# Patient Record
Sex: FEMALE | Race: AMERICAN INDIAN OR ALASKA NATIVE | URBAN - METROPOLITAN AREA
[De-identification: names, ages, dates, MRNs, and addresses within clinical notes are randomized per-mention and may not be internally consistent; named-entity substitution may affect disease eponyms.]

---

## 2018-03-18 ENCOUNTER — HOSPITAL ENCOUNTER (EMERGENCY)
Facility: CLINIC | Age: 28
Discharge: HOME | End: 2018-03-18
Attending: EMERGENCY MEDICINE | Admitting: EMERGENCY MEDICINE

## 2018-03-18 VITALS
WEIGHT: 234.57 LBS | OXYGEN SATURATION: 93 % | DIASTOLIC BLOOD PRESSURE: 78 MMHG | TEMPERATURE: 97.7 F | SYSTOLIC BLOOD PRESSURE: 130 MMHG | HEART RATE: 84 BPM | RESPIRATION RATE: 16 BRPM

## 2018-03-18 DIAGNOSIS — K08.89 PAIN, DENTAL: ICD-10-CM

## 2018-03-18 PROCEDURE — 99284 EMERGENCY DEPT VISIT MOD MDM: CPT | Mod: Z6 | Performed by: EMERGENCY MEDICINE

## 2018-03-18 PROCEDURE — 99282 EMERGENCY DEPT VISIT SF MDM: CPT | Performed by: EMERGENCY MEDICINE

## 2018-03-18 RX ORDER — PENICILLIN V POTASSIUM 500 MG/1
500 TABLET, FILM COATED ORAL 3 TIMES DAILY
Qty: 21 TABLET | Refills: 0 | Status: SHIPPED | OUTPATIENT
Start: 2018-03-18 | End: 2018-03-25

## 2018-03-18 ASSESSMENT — ENCOUNTER SYMPTOMS
VOMITING: 0
ABDOMINAL PAIN: 0
DIARRHEA: 0
ARTHRALGIAS: 0
FEVER: 0
COLOR CHANGE: 0
SHORTNESS OF BREATH: 0
SORE THROAT: 0
NAUSEA: 0
PALPITATIONS: 0
COUGH: 0

## 2018-03-18 NOTE — ED AVS SNAPSHOT
[facility], [place], Emergency Department    [address] [address]    Phone:  [phone]                                       Valeria Head   MRN: [id number]    Department:  [facility], [place], Emergency Department   Date of Visit:  3/18/2018           Patient Information     Date Of Birth          1990        Your diagnoses for this visit were:     Pain, dental        You were seen by Alexandria Vasques MD.        Discharge Instructions       You have been seen in the ER for dental pain.  We recommend that you take Tylenol and Motrin as needed for pain.  We have given you a prescription for antibiotics in case the pain is being caused by an infection underneath the root of the tooth.  You need to follow-up with a dental clinic.  We have given you multiple phone numbers and we recommend that you call tomorrow to try to see a dentist as soon as possible.    Many of these clinics offer a sliding fee option for patients that qualify, and see patients on a walk-in or same day basis. Please call each clinic directly. As services, hours, fees and policies vary greatly.    [place]:  [facility]     [phone]  [facility] ([facility]) [phone]  [facility]  [phone]  [facility]      [phone]   [facility]    [phone]  [facility]  [phone]  [facility] [phone]  [facility]     [phone]  [facility]   [phone]  [facility] (cash only)   [phone]  [facility]    [phone] (adults)          [phone] (children)    [place]:  [facility]     [phone]; [phone]  [facility]     [phone]  [facility]     [phone]  [facility] (free,  limited) [phone]    Multiple Locations:  [facility]       [phone]    [facility] (phone: [phone])      Discharge References/Attachments     DENTAL PAIN (ENGLISH)      24 Hour Appointment Hotline       To make an appointment at any [facility], call [phone]). If you don't have a family doctor or clinic, we will help you find one. [facility] are conveniently located to serve the needs of you and your family.             Review of your medicines      START taking        Dose / Directions Last dose taken    penicillin V potassium 500 MG tablet   Commonly known as:  VEETID   Dose:  500 mg   Quantity:  21 tablet        Take 1 tablet (500 mg) by mouth 3 times daily for 7 days   Refills:  0                Prescriptions were sent or printed at these locations (1 Prescription)                   Other Prescriptions                Printed at Department/Unit printer (1 of 1)         penicillin V potassium (VEETID) 500 MG tablet                Orders Needing Specimen Collection     None      Pending Results     No orders found from 3/16/2018 to 3/19/2018.            Pending Culture Results     No orders found from 3/16/2018 to 3/19/2018.            Pending Results Instructions     If you had any lab results that were not finalized at the time of your Discharge, you can call the ED Lab Result RN at [phone]. You will be contacted by this team for any positive Lab results or changes in treatment. The nurses are available 7 days a week from 10A to 6:30P.  You can leave a message 24 hours per day and they will return your call.        Thank you for choosing [facility]       Thank you for choosing [facility] for your care. Our goal is always to provide you with excellent care. Hearing back from our patients is one way we can continue to improve our services. Please take a few minutes to complete the written survey that you may receive in the mail after you visit  "with us. Thank you!        [employer] Information     [employer] lets you send messages to your doctor, view your test results, renew your prescriptions, schedule appointments and more. To sign up, go to [email or web address] . Click on \"Log in\" on the left side of the screen, which will take you to the Welcome page. Then click on \"Sign up Now\" on the right side of the page.     You will be asked to enter the access code listed below, as well as some personal information. Please follow the directions to create your username and password.     Your access code is: [email or web address]  Expires: 2018  7:44 PM     Your access code will  in 90 days. If you need help or a new code, please call your [facility] clinic or [phone].        Care EveryWhere ID     This is your Care EveryWhere ID. This could be used by other organizations to access your [facility] medical records  [id number]        Equal Access to Services     CHARLEEN ABAD : Hadii gomez crockero Soalexis, waaxda luqadaha, qaybta kaalmada adeegkimberly, kadeem cox . So [facility] [phone].    ATENCIÓN: Si habla español, tiene a cornell disposición servicios gratuitos de asistencia lingüística. Llame al [phone].    We comply with applicable federal civil rights laws and [place] laws. We do not discriminate on the basis of race, color, national origin, age, disability, sex, sexual orientation, or gender identity.            After Visit Summary       This is your record. Keep this with you and show to your community pharmacist(s) and doctor(s) at your next visit.                  "

## 2018-03-18 NOTE — ED NOTES
Arrived to ED d/t toothache. Symptoms started 2 days ago. Pt took tylenol for the pain. She has not seen dentist for the tooth pain. She is 16 weeks pregnant. VSS upon arrival to ED.

## 2018-03-18 NOTE — ED AVS SNAPSHOT
[facility], [place], Emergency Department    [address] [address]    Phone:  [phone]                                       Valeria Head   MRN: [id number]    Department:  [facility], Emergency Department   Date of Visit:  3/18/2018           After Visit Summary Signature Page     I have received my discharge instructions, and my questions have been answered. I have discussed any challenges I see with this plan with the nurse or doctor.    ..........................................................................................................................................  Patient/Patient Representative Signature      ..........................................................................................................................................  Patient Representative Print Name and Relationship to Patient    ..................................................               ................................................  Date                                            Time    ..........................................................................................................................................  Reviewed by Signature/Title    ...................................................              ..............................................  Date                                                            Time

## 2018-03-19 NOTE — ED PROVIDER NOTES
"  History     Chief Complaint   Patient presents with     Dental Pain     The history is provided by the patient.     Valeria Dominguez is a 27 year old female who presents to the emergency department today for tooth pain.  Patient reports that her left upper molar began bothering her 2 days ago when it \"cracked\".  She notes pain radiating to her left ear.  She has not seen a dentist for this.  She denies any fevers or chills.  No difficulty swallowing.  She is currently 16 weeks pregnant.    I have reviewed the Medications, Allergies, Past Medical and Surgical History, and Social History in the Epic system.    Review of Systems   Constitutional: Negative for fever.   HENT: Positive for dental problem. Negative for congestion and sore throat.    Respiratory: Negative for cough and shortness of breath.    Cardiovascular: Negative for chest pain and palpitations.   Gastrointestinal: Negative for abdominal pain, diarrhea, nausea and vomiting.   Musculoskeletal: Negative for arthralgias.   Skin: Negative for color change.   All other systems reviewed and are negative.      Physical Exam   BP: 130/78  Pulse: 84  Temp: 97.7  F (36.5  C)  Resp: 16  Weight: 106.4 kg (234 lb 9.1 oz)  SpO2: 93 %      Physical Exam   Constitutional: No distress.   NA female, alert, cooperative, texting on phone   HENT:   Head: Normocephalic and atraumatic.   Mouth/Throat: Oropharynx is clear and moist. No oropharyngeal exudate.   Poor dentition generally.  Multiple missing and decayed teeth. Tooth #7 is fractured and decayed.  No surrounding periaptcal abscess. Sublingual structures are soft. Posterior oropharyngeal structures are symmetric. No swelling or erythema of skin/jaw.   Eyes: Pupils are equal, round, and reactive to light. No scleral icterus.   Cardiovascular: Normal heart sounds and intact distal pulses.    Pulmonary/Chest: Breath sounds normal. No respiratory distress.   Abdominal: Soft. Bowel sounds are normal. There is no tenderness. "   Gravid, soft   Musculoskeletal: She exhibits no edema or tenderness.   Skin: Skin is warm. No rash noted. She is not diaphoretic.   Nursing note and vitals reviewed.      ED Course     ED Course     Procedures             Critical Care time:  none             Labs Ordered and Resulted from Time of ED Arrival Up to the Time of Departure from the ED - No data to display         Assessments & Plan (with Medical Decision Making)   Patient presents for the above complaints.  On my evaluation she is alert, cooperative, appears uncomfortable.  Exam of the oropharynx reveals multiple decayed teeth with poor dentition generally.  Tooth #15 is fractured and decayed.  I do not see any surrounding gingival abscess.  Posterior pharyngeal structures are symmetric and intact.  Sublingual structures are soft.    I do not see any sign of serious infection such as Hira angina or a posterior abscess such as a PTA.  Suspect that she probably has pain secondary to her tooth decay, though I cannot rule out a periapical abscess.  I will discharge her on penicillin.  I recommended that she alternate Tylenol and Motrin.  I will give her the phone numbers to multiple dental clinics and advised that she call to make an appointment as soon as possible.    I have reviewed the nursing notes.    I have reviewed the findings, diagnosis, plan and need for follow up with the patient.    New Prescriptions    PENICILLIN V POTASSIUM (VEETID) 500 MG TABLET    Take 1 tablet (500 mg) by mouth 3 times daily for 7 days       Final diagnoses:   Pain, dental       3/18/2018   [facility], [place], EMERGENCY DEPARTMENT     Alexandria Vasques MD  03/18/18 [id number]

## 2018-03-19 NOTE — DISCHARGE INSTRUCTIONS
You have been seen in the ER for dental pain.  We recommend that you take Tylenol and Motrin as needed for pain.  We have given you a prescription for antibiotics in case the pain is being caused by an infection underneath the root of the tooth.  You need to follow-up with a dental clinic.  We have given you multiple phone numbers and we recommend that you call tomorrow to try to see a dentist as soon as possible.    Many of these clinics offer a sliding fee option for patients that qualify, and see patients on a walk-in or same day basis. Please call each clinic directly. As services, hours, fees and policies vary greatly.    [place]:  [facility]     [phone]  [facility] ([facility]) [phone]  [facility]  [phone]  [facility]      [phone]   [facility]    [phone]  [facility]  [phone]  [facility] [phone]  [facility]     [phone]  [facility]   [phone]  [facility] (cash only)   [phone]  [facility]    [phone] (adults)          [phone] (children)    [place]:  [facility]     [phone]; [phone]  [facility]     [phone]  [facility]     [phone]  [facility] (free, limited)    [phone]    Multiple Locations:  [facility]       [phone]    [facility] (phone: [phone])